# Patient Record
Sex: FEMALE | Race: WHITE
[De-identification: names, ages, dates, MRNs, and addresses within clinical notes are randomized per-mention and may not be internally consistent; named-entity substitution may affect disease eponyms.]

---

## 2017-12-13 NOTE — REP
OB ULTRASOUND:

 

Real-time sonographic evaluation of the gravid uterus is performed utilizing

transabdominal technique.  There is a single living intrauterine gestation,

estimated gestational age 18 weeks 1 day based on LMP with EDC 05/15/2018.

Today's measurements indicate appropriate growth.

 

BPD                            44 mm = 19 weeks 3 days, 85th percentile

 

HC                           162 mm = 19 weeks 0 days, 76th percentile

 

AC                           125 mm = 18 weeks 1 day, 50th percentile

 

Femur length                   30 mm = 19 weeks 2 days, 79th percentile

 

HC/AC ratio 1.29 is slightly above normal range of 1.07 to 1.26.  Estimated fetal

weight 253 grams, 71st percentile.  Cervix is closed and measures 4.2 cm in

length.  Fetal heart rate 141 beats per minute.

 

   SEEN/GROSSLY UNREMARKABLE

 

Lateral ventricles                       Yes

 

Posterior fossa                          Yes

 

Upper lip                                    Yes

 

Four-chamber heart                   Yes

 

LVOT                                         Yes

 

RVOT                                        Yes

 

Stomach                                    Yes

 

Cord insertion                            Yes

 

Three vessel cord                                               No

 

Kidneys                                      Yes

 

Bladder                                      Yes

 

Spine                                         Yes

 

Fetal position:  Breech.

 

Placenta:  Anterior and grade 0 with no previa or abruption.

 

Amniotic fluid:     Within normal limits.

 

 

Signed by

Baldev Martinez MD 12/14/2017 09:07 A

## 2018-02-20 ENCOUNTER — HOSPITAL ENCOUNTER (OUTPATIENT)
Dept: HOSPITAL 53 - M LAB | Age: 23
End: 2018-02-20
Attending: ADVANCED PRACTICE MIDWIFE
Payer: COMMERCIAL

## 2018-02-20 DIAGNOSIS — Z34.83: Primary | ICD-10-CM

## 2018-02-20 LAB
BASO #: 0 10^3/UL (ref 0–0.2)
BASO %: 0.3 % (ref 0–1)
EOS #: 0.4 10^3/UL (ref 0–0.5)
EOSINOPHIL NFR BLD AUTO: 3.2 % (ref 0–3)
HEMATOCRIT: 34.2 % (ref 36–47)
HEMOGLOBIN: 11.2 G/DL (ref 12–16)
IMMATURE GRANULOCYTE %: 1.6 % (ref 0–3)
LYMPH #: 2 10^3/UL (ref 1.5–6.5)
LYMPH %: 17.3 % (ref 24–44)
MEAN CORPUSCULAR HEMOGLOBIN: 27.3 PG (ref 27–33)
MEAN CORPUSCULAR HGB CONC: 32.7 G/DL (ref 32–36.5)
MEAN CORPUSCULAR VOLUME: 83.2 FL (ref 80–96)
MONO #: 0.9 10^3/UL (ref 0–0.8)
MONO %: 7.6 % (ref 0–5)
NEUTROPHILS #: 8.1 10^3/UL (ref 1.8–7.7)
NEUTROPHILS %: 70 % (ref 36–66)
NRBC BLD AUTO-RTO: 0 % (ref 0–0)
PLATELET COUNT, AUTOMATED: 210 10^3/UL (ref 150–450)
RED BLOOD COUNT: 4.11 10^6/UL (ref 4–5.4)
RED CELL DISTRIBUTION WIDTH: 13.2 % (ref 11.5–14.5)
WHITE BLOOD COUNT: 11.6 10^3/UL (ref 4–10)

## 2018-02-20 PROCEDURE — 85025 COMPLETE CBC W/AUTO DIFF WBC: CPT

## 2018-04-19 ENCOUNTER — HOSPITAL ENCOUNTER (OUTPATIENT)
Dept: HOSPITAL 53 - M LAB REF | Age: 23
End: 2018-04-19
Attending: ADVANCED PRACTICE MIDWIFE
Payer: COMMERCIAL

## 2018-04-19 DIAGNOSIS — Z34.83: Primary | ICD-10-CM

## 2018-04-24 ENCOUNTER — HOSPITAL ENCOUNTER (OUTPATIENT)
Dept: HOSPITAL 53 - M RAD | Age: 23
End: 2018-04-24
Attending: ADVANCED PRACTICE MIDWIFE
Payer: COMMERCIAL

## 2018-04-24 DIAGNOSIS — O26.843: Primary | ICD-10-CM

## 2018-04-24 DIAGNOSIS — Z3A.36: ICD-10-CM

## 2018-04-24 PROCEDURE — 76816 OB US FOLLOW-UP PER FETUS: CPT

## 2018-05-21 ENCOUNTER — HOSPITAL ENCOUNTER (INPATIENT)
Dept: HOSPITAL 53 - M LDI | Age: 23
LOS: 2 days | Discharge: HOME | End: 2018-05-23
Attending: ADVANCED PRACTICE MIDWIFE | Admitting: ADVANCED PRACTICE MIDWIFE
Payer: COMMERCIAL

## 2018-05-21 DIAGNOSIS — Z3A.40: ICD-10-CM

## 2018-05-21 DIAGNOSIS — O48.0: ICD-10-CM

## 2018-05-21 LAB
HEMATOCRIT: 34.2 % (ref 36–47)
HEMOGLOBIN: 10.6 G/DL (ref 12–15.5)
MEAN CORPUSCULAR HEMOGLOBIN: 23.7 PG (ref 27–33)
MEAN CORPUSCULAR HGB CONC: 31 G/DL (ref 32–36.5)
MEAN CORPUSCULAR VOLUME: 76.3 FL (ref 80–96)
NRBC BLD AUTO-RTO: 0 % (ref 0–0)
PLATELET COUNT, AUTOMATED: 192 10^3/UL (ref 150–450)
RED BLOOD COUNT: 4.48 10^6/UL (ref 4–5.4)
RED CELL DISTRIBUTION WIDTH: 15.3 % (ref 11.5–14.5)
SYPHILIS: NONREACTIVE
WHITE BLOOD COUNT: 10.5 10^3/UL (ref 4–10)

## 2018-05-21 PROCEDURE — 3E033VJ INTRODUCTION OF OTHER HORMONE INTO PERIPHERAL VEIN, PERCUTANEOUS APPROACH: ICD-10-PCS

## 2018-05-21 RX ADMIN — SODIUM CHLORIDE, POTASSIUM CHLORIDE, SODIUM LACTATE AND CALCIUM CHLORIDE 1 MLS/HR: 600; 310; 30; 20 INJECTION, SOLUTION INTRAVENOUS at 22:30

## 2018-05-21 RX ADMIN — MISOPROSTOL 1 MCG: 100 TABLET ORAL at 12:43

## 2018-05-21 RX ADMIN — MISOPROSTOL 1 MCG: 100 TABLET ORAL at 18:29

## 2018-05-21 RX ADMIN — Medication 1 MLS/HR: at 22:41

## 2018-05-22 RX ADMIN — IBUPROFEN 1 MG: 800 TABLET, FILM COATED ORAL at 07:58

## 2018-05-22 RX ADMIN — IBUPROFEN 1 MG: 800 TABLET, FILM COATED ORAL at 20:01

## 2018-05-22 RX ADMIN — PROMETHAZINE HYDROCHLORIDE 1 MG: 25 INJECTION INTRAMUSCULAR; INTRAVENOUS at 04:08

## 2018-05-22 RX ADMIN — ACETAMINOPHEN 1 MG: 500 TABLET ORAL at 11:18

## 2018-05-22 RX ADMIN — Medication 1 MLS/HR: at 06:20

## 2018-05-22 RX ADMIN — METHYLERGONOVINE MALEATE 1 MG: 0.2 INJECTION, SOLUTION INTRAMUSCULAR; INTRAVENOUS at 07:00

## 2018-05-22 RX ADMIN — BUTORPHANOL TARTRATE 1 MG: 2 INJECTION, SOLUTION INTRAMUSCULAR; INTRAVENOUS at 04:08

## 2018-05-23 RX ADMIN — IBUPROFEN 1 MG: 800 TABLET, FILM COATED ORAL at 10:10

## 2018-05-23 RX ADMIN — ACETAMINOPHEN 1 MG: 500 TABLET ORAL at 02:52

## 2018-05-23 RX ADMIN — DOCUSATE SODIUM 1 MG: 100 CAPSULE, LIQUID FILLED ORAL at 02:52

## 2018-05-23 RX ADMIN — Medication 1 TAB: at 10:10

## 2018-12-19 ENCOUNTER — HOSPITAL ENCOUNTER (OUTPATIENT)
Dept: HOSPITAL 53 - M LAB REF | Age: 23
End: 2018-12-19
Attending: ADVANCED PRACTICE MIDWIFE
Payer: COMMERCIAL

## 2018-12-19 DIAGNOSIS — Z12.4: Primary | ICD-10-CM

## 2018-12-19 DIAGNOSIS — Z11.3: Primary | ICD-10-CM

## 2018-12-19 LAB
CHLAMYDIA DNA AMPLIFICATION: NEGATIVE
N GONORRHOEA RRNA SPEC QL NAA+PROBE: NEGATIVE

## 2018-12-19 PROCEDURE — 87591 N.GONORRHOEAE DNA AMP PROB: CPT

## 2018-12-19 PROCEDURE — 87491 CHLMYD TRACH DNA AMP PROBE: CPT

## 2019-07-17 ENCOUNTER — HOSPITAL ENCOUNTER (OUTPATIENT)
Dept: HOSPITAL 53 - M SFHCLERA | Age: 24
End: 2019-07-17
Attending: PHYSICIAN ASSISTANT
Payer: OTHER GOVERNMENT

## 2019-07-17 DIAGNOSIS — R30.0: Primary | ICD-10-CM

## 2019-07-19 ENCOUNTER — HOSPITAL ENCOUNTER (EMERGENCY)
Dept: HOSPITAL 53 - M ED | Age: 24
LOS: 1 days | Discharge: HOME | End: 2019-07-20
Payer: COMMERCIAL

## 2019-07-19 VITALS — WEIGHT: 155.32 LBS | BODY MASS INDEX: 26.52 KG/M2 | HEIGHT: 64 IN

## 2019-07-19 DIAGNOSIS — B37.3: ICD-10-CM

## 2019-07-19 DIAGNOSIS — Z88.5: ICD-10-CM

## 2019-07-19 DIAGNOSIS — N10: Primary | ICD-10-CM

## 2019-07-19 LAB
ALBUMIN SERPL BCG-MCNC: 3.9 GM/DL (ref 3.2–5.2)
ALT SERPL W P-5'-P-CCNC: 20 U/L (ref 12–78)
APPEARANCE UR: (no result)
BACTERIA UR QL AUTO: NEGATIVE
BASOPHILS # BLD AUTO: 0 10^3/UL (ref 0–0.2)
BASOPHILS NFR BLD AUTO: 0.2 % (ref 0–1)
BILIRUB SERPL-MCNC: 0.3 MG/DL (ref 0.2–1)
BILIRUB UR QL STRIP.AUTO: NEGATIVE
BUN SERPL-MCNC: 16 MG/DL (ref 7–18)
CALCIUM SERPL-MCNC: 8.7 MG/DL (ref 8.5–10.1)
CHLORIDE SERPL-SCNC: 109 MEQ/L (ref 98–107)
CO2 SERPL-SCNC: 27 MEQ/L (ref 21–32)
CREAT SERPL-MCNC: 0.68 MG/DL (ref 0.55–1.3)
EOSINOPHIL # BLD AUTO: 0.2 10^3/UL (ref 0–0.5)
EOSINOPHIL NFR BLD AUTO: 1.6 % (ref 0–3)
GFR SERPL CREATININE-BSD FRML MDRD: > 60 ML/MIN/{1.73_M2} (ref 60–?)
GLUCOSE SERPL-MCNC: 99 MG/DL (ref 70–100)
GLUCOSE UR QL STRIP.AUTO: NEGATIVE MG/DL
HCT VFR BLD AUTO: 41.9 % (ref 36–47)
HGB BLD-MCNC: 13.6 G/DL (ref 12–15.5)
HGB UR QL STRIP.AUTO: NEGATIVE
KETONES UR QL STRIP.AUTO: NEGATIVE MG/DL
LEUKOCYTE ESTERASE UR QL STRIP.AUTO: (no result)
LYMPHOCYTES # BLD AUTO: 3.7 10^3/UL (ref 1.5–6.5)
LYMPHOCYTES NFR BLD AUTO: 31.9 % (ref 24–44)
MCH RBC QN AUTO: 29 PG (ref 27–33)
MCHC RBC AUTO-ENTMCNC: 32.5 G/DL (ref 32–36.5)
MCV RBC AUTO: 89.3 FL (ref 80–96)
MONOCYTES # BLD AUTO: 0.8 10^3/UL (ref 0–0.8)
MONOCYTES NFR BLD AUTO: 6.9 % (ref 0–5)
MUCOUS THREADS URNS QL MICRO: (no result)
NEUTROPHILS # BLD AUTO: 6.8 10^3/UL (ref 1.8–7.7)
NEUTROPHILS NFR BLD AUTO: 59.1 % (ref 36–66)
NITRITE UR QL STRIP.AUTO: NEGATIVE
PH UR STRIP.AUTO: 6 UNITS (ref 5–9)
PLATELET # BLD AUTO: 285 10^3/UL (ref 150–450)
POTASSIUM SERPL-SCNC: 3.8 MEQ/L (ref 3.5–5.1)
PROT SERPL-MCNC: 7.4 GM/DL (ref 6.4–8.2)
PROT UR QL STRIP.AUTO: NEGATIVE MG/DL
RBC # BLD AUTO: 4.69 10^6/UL (ref 4–5.4)
RBC # UR AUTO: 10 /HPF (ref 0–3)
SODIUM SERPL-SCNC: 140 MEQ/L (ref 136–145)
SP GR UR STRIP.AUTO: 1.02 (ref 1–1.03)
SQUAMOUS #/AREA URNS AUTO: 3 /HPF (ref 0–6)
UROBILINOGEN UR QL STRIP.AUTO: 0.2 MG/DL (ref 0–2)
WBC # BLD AUTO: 11.5 10^3/UL (ref 4–10)
WBC #/AREA URNS AUTO: 11 /HPF (ref 0–3)

## 2019-07-19 PROCEDURE — 87661 TRICHOMONAS VAGINALIS AMPLIF: CPT

## 2019-07-19 PROCEDURE — 96372 THER/PROPH/DIAG INJ SC/IM: CPT

## 2019-07-19 PROCEDURE — 99283 EMERGENCY DEPT VISIT LOW MDM: CPT

## 2019-07-19 PROCEDURE — 81001 URINALYSIS AUTO W/SCOPE: CPT

## 2019-07-19 PROCEDURE — 85025 COMPLETE CBC W/AUTO DIFF WBC: CPT

## 2019-07-19 PROCEDURE — 80053 COMPREHEN METABOLIC PANEL: CPT

## 2019-07-19 PROCEDURE — 87086 URINE CULTURE/COLONY COUNT: CPT

## 2019-07-20 VITALS — SYSTOLIC BLOOD PRESSURE: 126 MMHG | DIASTOLIC BLOOD PRESSURE: 80 MMHG

## 2019-07-20 LAB
CHLAMYDIA DNA AMPLIFICATION: NEGATIVE
N GONORRHOEA RRNA SPEC QL NAA+PROBE: NEGATIVE